# Patient Record
Sex: FEMALE | Race: WHITE | Employment: OTHER | URBAN - METROPOLITAN AREA
[De-identification: names, ages, dates, MRNs, and addresses within clinical notes are randomized per-mention and may not be internally consistent; named-entity substitution may affect disease eponyms.]

---

## 2017-12-21 NOTE — PATIENT DISCUSSION
DRY EYES : Discussed with patient the importance of keeping the eye moist and the symptoms associated with dry eyes including blurry vision, tearing, burning, and oliverio sensation. Advised patient to minimize use of any fans blowing directly on the face. Advised patient to continue with artificial tears 2-3 times daily.

## 2017-12-21 NOTE — PATIENT DISCUSSION
POAG, OU: INTRAOCULAR PRESSURE IS WITHIN ACCEPTABLE LIMITS. PT INSTRUCTED TO STOP LATANOPROST QHS OU AND RETURN FOR FOLLOW-UP AS SCHEDULED.

## 2018-02-12 NOTE — PATIENT DISCUSSION
Dr. Cain Edmonds prescribed Alrex 1 drop 3 times a day for 2 weeks for eye irritation and discomfort. Will follow back in 3 weeks.

## 2018-02-12 NOTE — PATIENT DISCUSSION
New Prescription: Alrex (loteprednol etabonate): drops,suspension: 0.2% 1 drop three times a day as directed into both eyes 02-

## 2018-02-12 NOTE — PATIENT DISCUSSION
DRY EYES : Discussed with patient the importance of keeping the eye moist and the symptoms associated with dry eyes including blurry vision, tearing, burning, and oliverio sensation. Tear Lab was performed today to evaluate the severity of dryness. Advised patient to minimize use of any fans blowing directly on the face. Advised patient to continue with over the counter artificial tears 2-3 times daily.

## 2020-02-28 ENCOUNTER — NEW PATIENT COMPREHENSIVE (OUTPATIENT)
Dept: URBAN - METROPOLITAN AREA CLINIC 36 | Facility: CLINIC | Age: 75
End: 2020-02-28

## 2020-02-28 DIAGNOSIS — H25.811: ICD-10-CM

## 2020-02-28 DIAGNOSIS — H25.812: ICD-10-CM

## 2020-02-28 DIAGNOSIS — H52.7: ICD-10-CM

## 2020-02-28 DIAGNOSIS — H40.1134: ICD-10-CM

## 2020-02-28 PROCEDURE — 92012 INTRM OPH EXAM EST PATIENT: CPT

## 2020-02-28 PROCEDURE — 92133 CPTRZD OPH DX IMG PST SGM ON: CPT

## 2020-02-28 PROCEDURE — 92015 DETERMINE REFRACTIVE STATE: CPT

## 2020-02-28 ASSESSMENT — VISUAL ACUITY
OD_CC: J2
OD_SC: 20/100
OS_SC: 20/80
OS_CC: 20/40+2
OS_CC: J2
OS_SC: <J10
OD_SC: <J10
OD_CC: 20/30

## 2020-02-28 ASSESSMENT — TONOMETRY
OS_IOP_MMHG: 16
OD_IOP_MMHG: 16

## 2020-05-29 ENCOUNTER — FOLLOW UP (OUTPATIENT)
Dept: URBAN - METROPOLITAN AREA CLINIC 36 | Facility: CLINIC | Age: 75
End: 2020-05-29

## 2020-05-29 DIAGNOSIS — H40.1131: ICD-10-CM

## 2020-05-29 PROCEDURE — 92012 INTRM OPH EXAM EST PATIENT: CPT

## 2020-05-29 PROCEDURE — 92083 EXTENDED VISUAL FIELD XM: CPT

## 2020-05-29 ASSESSMENT — VISUAL ACUITY
OS_CC: 20/25
OD_CC: 20/25

## 2020-05-29 ASSESSMENT — TONOMETRY
OS_IOP_MMHG: 13
OD_IOP_MMHG: 10

## 2020-12-01 ENCOUNTER — IOP CHECK (OUTPATIENT)
Dept: URBAN - METROPOLITAN AREA CLINIC 36 | Facility: CLINIC | Age: 75
End: 2020-12-01

## 2020-12-01 DIAGNOSIS — H40.1131: ICD-10-CM

## 2020-12-01 PROCEDURE — 92133 CPTRZD OPH DX IMG PST SGM ON: CPT

## 2020-12-01 PROCEDURE — 92012 INTRM OPH EXAM EST PATIENT: CPT

## 2020-12-01 ASSESSMENT — VISUAL ACUITY
OS_CC: 20/30-2
OD_CC: 20/25-1

## 2020-12-01 ASSESSMENT — TONOMETRY
OD_IOP_MMHG: 14
OS_IOP_MMHG: 12

## 2021-05-26 ENCOUNTER — ESTABLISHED COMPREHENSIVE EXAM (OUTPATIENT)
Dept: URBAN - METROPOLITAN AREA CLINIC 36 | Facility: CLINIC | Age: 76
End: 2021-05-26

## 2021-05-26 DIAGNOSIS — H40.1131: ICD-10-CM

## 2021-05-26 DIAGNOSIS — H25.811: ICD-10-CM

## 2021-05-26 DIAGNOSIS — H25.812: ICD-10-CM

## 2021-05-26 PROCEDURE — 92014 COMPRE OPH EXAM EST PT 1/>: CPT

## 2021-05-26 PROCEDURE — 92015 DETERMINE REFRACTIVE STATE: CPT

## 2021-05-26 ASSESSMENT — TONOMETRY
OD_IOP_MMHG: 16
OS_IOP_MMHG: 16

## 2021-05-26 ASSESSMENT — VISUAL ACUITY
OS_SC: 20/100-1
OS_CC: 20/30-2
OD_CC: J3
OS_SC: J>12
OD_SC: 20/100
OS_CC: J3
OD_SC: J>12
OD_CC: 20/25

## 2021-12-07 ENCOUNTER — FOLLOW UP (OUTPATIENT)
Dept: URBAN - METROPOLITAN AREA CLINIC 36 | Facility: CLINIC | Age: 76
End: 2021-12-07

## 2021-12-07 DIAGNOSIS — H40.1131: ICD-10-CM

## 2021-12-07 PROCEDURE — 92133 CPTRZD OPH DX IMG PST SGM ON: CPT

## 2021-12-07 PROCEDURE — 92012 INTRM OPH EXAM EST PATIENT: CPT

## 2021-12-07 ASSESSMENT — TONOMETRY
OD_IOP_MMHG: 17
OS_IOP_MMHG: 16

## 2021-12-07 ASSESSMENT — VISUAL ACUITY
OD_CC: 20/30+2
OS_CC: 20/30+2

## 2022-06-08 ENCOUNTER — COMPREHENSIVE EXAM (OUTPATIENT)
Dept: URBAN - METROPOLITAN AREA CLINIC 36 | Facility: CLINIC | Age: 77
End: 2022-06-08

## 2022-06-08 DIAGNOSIS — H52.7: ICD-10-CM

## 2022-06-08 DIAGNOSIS — H40.1131: ICD-10-CM

## 2022-06-08 DIAGNOSIS — H25.813: ICD-10-CM

## 2022-06-08 PROCEDURE — 92015 DETERMINE REFRACTIVE STATE: CPT

## 2022-06-08 PROCEDURE — 92014 COMPRE OPH EXAM EST PT 1/>: CPT

## 2022-06-08 ASSESSMENT — VISUAL ACUITY
OD_SC: J12
OU_CC: 20/25
OS_SC: J12
OU_SC: 20/100+1
OS_CC: 20/40+2
OU_SC: J12
OD_CC: 20/25
OD_SC: 20/200
OS_PH: 20/25-1
OD_CC: J2
OU_CC: J2
OS_SC: 20/100
OS_CC: J4

## 2022-06-08 ASSESSMENT — TONOMETRY
OS_IOP_MMHG: 16
OD_IOP_MMHG: 16

## 2025-07-11 ENCOUNTER — COMPREHENSIVE EXAM (OUTPATIENT)
Age: 80
End: 2025-07-11

## 2025-07-11 DIAGNOSIS — H25.813: ICD-10-CM

## 2025-07-11 DIAGNOSIS — H40.023: ICD-10-CM

## 2025-07-11 DIAGNOSIS — H52.7: ICD-10-CM

## 2025-07-11 DIAGNOSIS — T66.XXXA: ICD-10-CM

## 2025-07-11 PROCEDURE — 92015 DETERMINE REFRACTIVE STATE: CPT

## 2025-07-11 PROCEDURE — 92014 COMPRE OPH EXAM EST PT 1/>: CPT

## 2025-07-11 PROCEDURE — 92134 CPTRZ OPH DX IMG PST SGM RTA: CPT

## 2025-08-08 ENCOUNTER — CONSULTATION/EVALUATION (OUTPATIENT)
Age: 80
End: 2025-08-08

## 2025-08-08 DIAGNOSIS — T66.XXXA: ICD-10-CM

## 2025-08-08 DIAGNOSIS — H25.813: ICD-10-CM

## 2025-08-08 DIAGNOSIS — H40.1131: ICD-10-CM

## 2025-08-08 PROCEDURE — 92136 OPHTHALMIC BIOMETRY: CPT

## 2025-08-08 PROCEDURE — 92025-3 CORNEAL TOPO, REFUSED

## 2025-08-08 PROCEDURE — 92014 COMPRE OPH EXAM EST PT 1/>: CPT

## 2025-08-08 PROCEDURE — 92020 GONIOSCOPY: CPT

## 2025-08-08 PROCEDURE — 92134 CPTRZ OPH DX IMG PST SGM RTA: CPT

## 2025-08-08 PROCEDURE — 92250 FUNDUS PHOTOGRAPHY W/I&R: CPT

## 2025-08-08 RX ORDER — MOXIFLOXACIN OPHTHALMIC 5 MG/ML: 1 SOLUTION/ DROPS OPHTHALMIC

## 2025-08-08 RX ORDER — KETOROLAC TROMETHAMINE 5 MG/ML: 1 SOLUTION OPHTHALMIC

## 2025-08-25 ENCOUNTER — SURGERY/PROCEDURE (OUTPATIENT)
Age: 80
End: 2025-08-25

## 2025-08-25 ENCOUNTER — PRE-OP/H&P (OUTPATIENT)
Age: 80
End: 2025-08-25

## 2025-08-25 DIAGNOSIS — H25.811: ICD-10-CM

## 2025-08-25 DIAGNOSIS — H25.813: ICD-10-CM

## 2025-08-25 DIAGNOSIS — H40.1111: ICD-10-CM

## 2025-08-25 PROCEDURE — 66174 TRLUML DIL AQ O/F CAN W/O ST: CPT

## 2025-08-25 PROCEDURE — 66991 XCAPSL CTRC RMVL INSJ 1+: CPT

## 2025-08-25 PROCEDURE — 99211HP PRE-OP

## 2025-08-26 ENCOUNTER — POST-OP (OUTPATIENT)
Age: 80
End: 2025-08-26

## 2025-08-26 DIAGNOSIS — Z96.1: ICD-10-CM

## 2025-08-26 PROCEDURE — 99024 POSTOP FOLLOW-UP VISIT: CPT
